# Patient Record
Sex: FEMALE | Race: WHITE | NOT HISPANIC OR LATINO
[De-identification: names, ages, dates, MRNs, and addresses within clinical notes are randomized per-mention and may not be internally consistent; named-entity substitution may affect disease eponyms.]

---

## 2017-07-01 ENCOUNTER — APPOINTMENT (OUTPATIENT)
Dept: OBGYN | Facility: CLINIC | Age: 42
End: 2017-07-01

## 2017-07-01 VITALS
DIASTOLIC BLOOD PRESSURE: 78 MMHG | HEART RATE: 73 BPM | BODY MASS INDEX: 29.82 KG/M2 | HEIGHT: 67 IN | SYSTOLIC BLOOD PRESSURE: 114 MMHG | WEIGHT: 190 LBS

## 2017-07-01 RX ORDER — TOPIRAMATE 100 MG/1
100 TABLET, FILM COATED ORAL
Qty: 60 | Refills: 0 | Status: ACTIVE | COMMUNITY
Start: 2017-03-24

## 2017-07-03 LAB — HPV HIGH+LOW RISK DNA PNL CVX: NEGATIVE

## 2017-07-06 LAB — CYTOLOGY CVX/VAG DOC THIN PREP: NORMAL

## 2017-10-28 ENCOUNTER — APPOINTMENT (OUTPATIENT)
Dept: OBGYN | Facility: CLINIC | Age: 42
End: 2017-10-28
Payer: COMMERCIAL

## 2017-10-28 VITALS
HEART RATE: 77 BPM | WEIGHT: 188 LBS | DIASTOLIC BLOOD PRESSURE: 80 MMHG | HEIGHT: 67 IN | BODY MASS INDEX: 29.51 KG/M2 | SYSTOLIC BLOOD PRESSURE: 121 MMHG

## 2017-10-28 DIAGNOSIS — N32.81 OVERACTIVE BLADDER: ICD-10-CM

## 2017-10-28 DIAGNOSIS — N39.46 MIXED INCONTINENCE: ICD-10-CM

## 2017-10-28 PROCEDURE — 99214 OFFICE O/P EST MOD 30 MIN: CPT

## 2017-10-28 RX ORDER — POTASSIUM CHLORIDE 750 MG/1
10 TABLET, FILM COATED, EXTENDED RELEASE ORAL
Qty: 30 | Refills: 0 | Status: DISCONTINUED | COMMUNITY
Start: 2017-01-03 | End: 2017-10-28

## 2017-11-03 RX ORDER — SOLIFENACIN SUCCINATE 5 MG/1
5 TABLET, FILM COATED ORAL DAILY
Qty: 30 | Refills: 11 | Status: DISCONTINUED | COMMUNITY
Start: 2017-10-28 | End: 2017-11-03

## 2018-05-08 RX ORDER — OXYBUTYNIN CHLORIDE 5 MG/1
5 TABLET, EXTENDED RELEASE ORAL
Qty: 90 | Refills: 3 | Status: ACTIVE | COMMUNITY
Start: 2018-05-08 | End: 1900-01-01

## 2018-07-02 ENCOUNTER — APPOINTMENT (OUTPATIENT)
Dept: OBGYN | Facility: CLINIC | Age: 43
End: 2018-07-02
Payer: COMMERCIAL

## 2018-07-02 VITALS
SYSTOLIC BLOOD PRESSURE: 120 MMHG | HEIGHT: 67 IN | BODY MASS INDEX: 32.02 KG/M2 | WEIGHT: 204 LBS | DIASTOLIC BLOOD PRESSURE: 70 MMHG

## 2018-07-02 PROCEDURE — 99396 PREV VISIT EST AGE 40-64: CPT

## 2018-07-03 LAB — HPV HIGH+LOW RISK DNA PNL CVX: NOT DETECTED

## 2018-07-07 LAB — CYTOLOGY CVX/VAG DOC THIN PREP: NORMAL

## 2019-07-18 ENCOUNTER — APPOINTMENT (OUTPATIENT)
Dept: OBGYN | Facility: CLINIC | Age: 44
End: 2019-07-18

## 2019-09-09 ENCOUNTER — APPOINTMENT (OUTPATIENT)
Dept: OBGYN | Facility: CLINIC | Age: 44
End: 2019-09-09
Payer: COMMERCIAL

## 2019-09-09 VITALS
WEIGHT: 220 LBS | DIASTOLIC BLOOD PRESSURE: 84 MMHG | HEIGHT: 67 IN | SYSTOLIC BLOOD PRESSURE: 121 MMHG | BODY MASS INDEX: 34.53 KG/M2

## 2019-09-09 PROCEDURE — 99396 PREV VISIT EST AGE 40-64: CPT

## 2019-09-09 NOTE — PHYSICAL EXAM
[Awake] : awake [Alert] : alert [Soft] : soft [Oriented x3] : oriented to person, place, and time [No Bleeding] : there was no active vaginal bleeding [Normal] : uterus [Uterine Adnexae] : were not tender and not enlarged [Acute Distress] : no acute distress [LAD] : no lymphadenopathy [Thyroid Nodule] : no thyroid nodule [Goiter] : no goiter [Mass] : no breast mass [Axillary LAD] : no axillary lymphadenopathy [Nipple Discharge] : no nipple discharge [Tender] : non tender [Distended] : not distended [H/Smegaly] : no hepatosplenomegaly [Flat Affect] : affect not flat [Depressed Mood] : not depressed [FreeTextEntry7] : slight ly enlarged fibroid uterus

## 2019-09-10 LAB — HPV HIGH+LOW RISK DNA PNL CVX: NOT DETECTED

## 2019-09-12 LAB — CYTOLOGY CVX/VAG DOC THIN PREP: NORMAL

## 2020-10-06 ENCOUNTER — APPOINTMENT (OUTPATIENT)
Dept: OBGYN | Facility: CLINIC | Age: 45
End: 2020-10-06
Payer: COMMERCIAL

## 2020-10-06 VITALS
WEIGHT: 222 LBS | DIASTOLIC BLOOD PRESSURE: 85 MMHG | SYSTOLIC BLOOD PRESSURE: 127 MMHG | HEIGHT: 67 IN | BODY MASS INDEX: 34.84 KG/M2

## 2020-10-06 PROCEDURE — 99396 PREV VISIT EST AGE 40-64: CPT

## 2020-10-08 LAB — HPV HIGH+LOW RISK DNA PNL CVX: NOT DETECTED

## 2020-10-12 LAB — CYTOLOGY CVX/VAG DOC THIN PREP: NORMAL

## 2021-03-03 ENCOUNTER — APPOINTMENT (OUTPATIENT)
Dept: OBGYN | Facility: CLINIC | Age: 46
End: 2021-03-03
Payer: COMMERCIAL

## 2021-03-03 PROCEDURE — 99443: CPT | Mod: 95

## 2021-05-06 ENCOUNTER — APPOINTMENT (OUTPATIENT)
Dept: OBGYN | Facility: CLINIC | Age: 46
End: 2021-05-06
Payer: COMMERCIAL

## 2021-05-06 PROCEDURE — 99443: CPT | Mod: 95

## 2021-05-06 RX ORDER — DROSPIRENONE, ETHINYL ESTRADIOL AND LEVOMEFOLATE CALCIUM AND LEVOMEFOLATE CALCIUM 3-0.02(24)
3-0.02-0.451 KIT ORAL DAILY
Qty: 84 | Refills: 1 | Status: DISCONTINUED | COMMUNITY
Start: 2021-03-03 | End: 2021-05-06

## 2021-05-06 NOTE — HISTORY OF PRESENT ILLNESS
[FreeTextEntry1] : 47 yo started on beyaz in march, pt is c/o periods lasting longer than usually, 8 days, she also c/o heavier periods also. She is not changing pads every 1 hour but would like her periods to be less. She is taking pill as directed, one per day, has no other side effects.

## 2021-05-06 NOTE — REASON FOR VISIT
[Home] : at home, [unfilled] , at the time of the visit. [Medical Office: ([facility])___] : at the medical office located in  [Verbal consent obtained from patient] : the patient, [unfilled] [Follow-Up] : a follow-up evaluation of [Abnormal Uterine Bleeding] : abnormal uterine bleeding

## 2021-05-06 NOTE — DISCUSSION/SUMMARY
[FreeTextEntry1] : Prolonged and heavier menses on beyaz. Will switch to nuvaring, pt did some reading and is interested in trying the ring, reviewed how to put it into vagina, wear it for 3 weeks, than remove and don’t wear anything for 1 week, reinsert a new ring after the week is over. RBAD\par rt in 2 months for BP/weight check/breast exam

## 2021-10-12 ENCOUNTER — APPOINTMENT (OUTPATIENT)
Dept: OBGYN | Facility: CLINIC | Age: 46
End: 2021-10-12
Payer: COMMERCIAL

## 2021-10-12 VITALS
SYSTOLIC BLOOD PRESSURE: 138 MMHG | HEIGHT: 67 IN | WEIGHT: 211 LBS | BODY MASS INDEX: 33.12 KG/M2 | HEART RATE: 81 BPM | DIASTOLIC BLOOD PRESSURE: 86 MMHG

## 2021-10-12 DIAGNOSIS — Z12.39 ENCOUNTER FOR OTHER SCREENING FOR MALIGNANT NEOPLASM OF BREAST: ICD-10-CM

## 2021-10-12 PROCEDURE — 99396 PREV VISIT EST AGE 40-64: CPT

## 2021-10-12 NOTE — PHYSICAL EXAM
[Awake] : awake [Alert] : alert [Acute Distress] : no acute distress [LAD] : no lymphadenopathy [Thyroid Nodule] : no thyroid nodule [Goiter] : no goiter [Mass] : no breast mass [Nipple Discharge] : no nipple discharge [Axillary LAD] : no axillary lymphadenopathy [Soft] : soft [Tender] : non tender [Distended] : not distended [H/Smegaly] : no hepatosplenomegaly [Oriented x3] : oriented to person, place, and time [Depressed Mood] : not depressed [Flat Affect] : affect not flat [Normal] : uterus [No Bleeding] : there was no active vaginal bleeding [Uterine Adnexae] : were not tender and not enlarged [FreeTextEntry7] : slight ly enlarged fibroid uterus

## 2021-10-14 LAB — HPV HIGH+LOW RISK DNA PNL CVX: NOT DETECTED

## 2021-10-18 LAB — CYTOLOGY CVX/VAG DOC THIN PREP: NORMAL

## 2021-12-01 ENCOUNTER — TRANSCRIPTION ENCOUNTER (OUTPATIENT)
Age: 46
End: 2021-12-01

## 2021-12-27 DIAGNOSIS — N63.0 UNSPECIFIED LUMP IN UNSPECIFIED BREAST: ICD-10-CM

## 2022-02-18 ENCOUNTER — APPOINTMENT (OUTPATIENT)
Dept: OBGYN | Facility: CLINIC | Age: 47
End: 2022-02-18
Payer: COMMERCIAL

## 2022-02-18 PROCEDURE — 99442: CPT | Mod: 95

## 2022-02-18 RX ORDER — ETONOGESTREL AND ETHINYL ESTRADIOL 11.7; 2.7 MG/1; MG/1
0.12-0.015 INSERT, EXTENDED RELEASE VAGINAL
Qty: 1 | Refills: 1 | Status: DISCONTINUED | COMMUNITY
Start: 2021-05-06 | End: 2022-02-18

## 2022-02-18 NOTE — HISTORY OF PRESENT ILLNESS
[FreeTextEntry1] : 45 yo states she used an online service to get put back on ocp, junel 1/20 fe 2 months ago. She would like to know if irregular bleeding is common in the beginning. She is taking it the same time daily. I have reviewed the common side effects and told pt we like to wait 3 months before changing pill since this side effect unusually subsides in 3-4 months. Pt will make appt. in office in 1 month to check bp/weigth and general f/u. She agrees with plan

## 2022-02-18 NOTE — REASON FOR VISIT
[Home] : at home, [unfilled] , at the time of the visit. [Medical Office: ([facility])___] : at the medical office located in  [Verbal consent obtained from patient] : the patient, [unfilled] [Follow-Up] : a follow-up evaluation of

## 2022-03-08 ENCOUNTER — APPOINTMENT (OUTPATIENT)
Dept: OBGYN | Facility: CLINIC | Age: 47
End: 2022-03-08
Payer: COMMERCIAL

## 2022-03-08 ENCOUNTER — RESULT CHARGE (OUTPATIENT)
Age: 47
End: 2022-03-08

## 2022-03-08 VITALS
BODY MASS INDEX: 34.53 KG/M2 | DIASTOLIC BLOOD PRESSURE: 88 MMHG | HEIGHT: 67 IN | WEIGHT: 220 LBS | SYSTOLIC BLOOD PRESSURE: 132 MMHG

## 2022-03-08 LAB
HCG UR QL: NEGATIVE
QUALITY CONTROL: YES

## 2022-03-08 PROCEDURE — 58100 BIOPSY OF UTERUS LINING: CPT

## 2022-03-08 PROCEDURE — 81025 URINE PREGNANCY TEST: CPT

## 2022-03-08 PROCEDURE — 99214 OFFICE O/P EST MOD 30 MIN: CPT | Mod: 25

## 2022-03-08 NOTE — PLAN
[FreeTextEntry1] : heavy menses/prolonged menses- I performed an Emb today, pt tolerated well. We discussed causes of heavy , prolonged menses. I would like her to return for pelvic sono. She is considering Nexplanon. In the mean time I will switch pill to Lore(pt read online reviews this was a good pill). lore is 1/20 and I feel she would do better with 1.5/30 so Inna has been called in. Reviewed RBAD of ocp, dvt and emboli risks were discussed. She does not want an Iud and I have also discussed uterine ablation as another option to control uterine bleeding.\par \par I will call her with results of EMB\par Motrin 600mg for cramping, q 6 hours

## 2022-03-08 NOTE — HISTORY OF PRESENT ILLNESS
[FreeTextEntry1] : 45 yo has been on junel 1/20 for 3 months, was put on it by an online service. She went on it because in November she had 10 days of bleeding. She is c/o heavy periods for the past  7 months. She is worried about the large blood clots she passes, changes every 3 hours. She is a previous smoker, no family h/o uterine cancer.

## 2022-03-08 NOTE — PROCEDURE
[Endometrial Biopsy] : Endometrial biopsy [Consent Obtained] : Consent obtained [Irregular Bleeding] : irregular uterine bleeding [Risks] : risks [Benefits] : benefits [Alternatives] : alternatives [Infection] : infection [Bleeding] : bleeding [Allergic Reaction] : allergic reaction [Uterine Perforation] : uterine perforation [Pain] : pain [Negative] : negative pregnancy test [No Premedication] : No premedication [None] : none [Tenaculum] : Tenaculum [Easy Passage] : Easy passage [Sounded to ___ cm] : sounded to [unfilled] ~Ucm [Mid Position] : mid position [Moderate] : moderate [Tolerated Well] : Patient tolerated the procedure well [No Complications] : No complications

## 2022-03-09 ENCOUNTER — TRANSCRIPTION ENCOUNTER (OUTPATIENT)
Age: 47
End: 2022-03-09

## 2022-03-16 LAB — CORE LAB BIOPSY: NORMAL

## 2022-03-17 ENCOUNTER — APPOINTMENT (OUTPATIENT)
Dept: ANTEPARTUM | Facility: CLINIC | Age: 47
End: 2022-03-17
Payer: COMMERCIAL

## 2022-03-17 ENCOUNTER — APPOINTMENT (OUTPATIENT)
Dept: OBGYN | Facility: CLINIC | Age: 47
End: 2022-03-17
Payer: COMMERCIAL

## 2022-03-17 ENCOUNTER — ASOB RESULT (OUTPATIENT)
Age: 47
End: 2022-03-17

## 2022-03-17 VITALS
BODY MASS INDEX: 34.53 KG/M2 | SYSTOLIC BLOOD PRESSURE: 143 MMHG | HEIGHT: 67 IN | WEIGHT: 220 LBS | HEART RATE: 77 BPM | DIASTOLIC BLOOD PRESSURE: 96 MMHG

## 2022-03-17 DIAGNOSIS — N83.291 OTHER OVARIAN CYST, RIGHT SIDE: ICD-10-CM

## 2022-03-17 PROCEDURE — 76830 TRANSVAGINAL US NON-OB: CPT

## 2022-03-17 PROCEDURE — 76856 US EXAM PELVIC COMPLETE: CPT | Mod: 59

## 2022-03-17 PROCEDURE — 99214 OFFICE O/P EST MOD 30 MIN: CPT

## 2022-03-17 NOTE — REASON FOR VISIT
[Follow-Up] : a follow-up evaluation of [FreeTextEntry2] : Ultrasound and discussion of plan of care

## 2022-03-17 NOTE — PLAN
[FreeTextEntry1] : \par Transvaginal/pelvic ultrasound reviewed with patient in detail and Risks, benefits, and alternatives of management options discussed at length.  Patient's ELIGIO was recently adjusted, and her bleeding stopped, and she wishes to continue with this medication for the meantime.  Patient was counseled on risk for VTE on a ELIGIO with concurrent hypertension.  Patient was counseled on long-acting reproductive contraceptives, specifically the Mirena or Kyleena IUD.  She did not wish for an IUD to be placed, but will think about it if the abnormal bleeding returns.\par \par Ovarian cyst discussed including pathology.  It appears to be a simple cyst and patient was given the signs and symptoms of cyst rupture.  If she develops symptoms she may return to office for repeat ultrasound to assess status.\par \par She is given opportunity ask questions all questions were addressed.

## 2022-03-17 NOTE — HISTORY OF PRESENT ILLNESS
[FreeTextEntry1] : 46-year-old female presenting office for her transvaginal/pelvic ultrasound secondary to a recent occurrence of abnormal uterine bleeding.  Patient's ELIGIO was recently adjusted, and she reports that her bleeding has since ceased.  She has not had this happen in the past.  She had an endometrial biopsy performed on 3/8/2022 which resulted negative for hyperplasia and negative for malignancy.  Her Pap smear  performed 10/12/2021 was within normal limits.\par \par Obstetrical history 1 prior vaginal delivery of a 9 pound baby.  Denies prior gynecologic history of uterine fibroids, ovarian cyst, STIs, or abnormal Pap smears.  Medical history of hypertension and she is currently on medication.  Her blood pressure is slightly elevated upon initial assessment today.  She reports she did not take her medication this morning.

## 2022-05-16 ENCOUNTER — RX RENEWAL (OUTPATIENT)
Age: 47
End: 2022-05-16

## 2022-06-27 ENCOUNTER — APPOINTMENT (OUTPATIENT)
Dept: OBGYN | Facility: CLINIC | Age: 47
End: 2022-06-27
Payer: SELF-PAY

## 2022-06-27 PROCEDURE — 99443: CPT | Mod: 95

## 2022-06-27 RX ORDER — MISOPROSTOL 200 UG/1
200 TABLET ORAL
Qty: 1 | Refills: 0 | Status: ACTIVE | COMMUNITY
Start: 2022-06-27 | End: 1900-01-01

## 2022-06-27 NOTE — HISTORY OF PRESENT ILLNESS
[FreeTextEntry1] : 48 yo telephonic to discuss getting an iud. She has a h/o long lasting periods, she has been on Junel but she has trouble remembering to take it. She has had vaginal deliveries in the past. I rec. she go on Mirena iud. All risks and benefits have been discussed in detail, pain with insertion discussed, irregular bleeding for 3-6 months common, risks of perforation, expulsion, difficulties in removal, infection.\par Pt is very interested, has asked appropriate question which I have answered. She will use Cytotec the night before procedure, motrin 600mg 1-2 hrs before. She will rt for insertion with her next period.

## 2022-06-27 NOTE — REASON FOR VISIT
[Home] : at home, [unfilled] , at the time of the visit. [Medical Office: ([facility])___] : at the medical office located in  [Verbal consent obtained from patient] : the patient, [unfilled] [Follow-Up] : a follow-up evaluation of

## 2022-06-30 ENCOUNTER — APPOINTMENT (OUTPATIENT)
Dept: OBGYN | Facility: CLINIC | Age: 47
End: 2022-06-30

## 2022-08-01 ENCOUNTER — APPOINTMENT (OUTPATIENT)
Dept: OBGYN | Facility: CLINIC | Age: 47
End: 2022-08-01

## 2022-08-20 ENCOUNTER — RESULT CHARGE (OUTPATIENT)
Age: 47
End: 2022-08-20

## 2022-08-20 ENCOUNTER — APPOINTMENT (OUTPATIENT)
Dept: OBGYN | Facility: CLINIC | Age: 47
End: 2022-08-20

## 2022-08-20 VITALS
WEIGHT: 221 LBS | HEIGHT: 67 IN | DIASTOLIC BLOOD PRESSURE: 80 MMHG | BODY MASS INDEX: 34.69 KG/M2 | SYSTOLIC BLOOD PRESSURE: 132 MMHG

## 2022-08-20 LAB
HCG UR QL: NEGATIVE
QUALITY CONTROL: YES

## 2022-08-20 PROCEDURE — 81025 URINE PREGNANCY TEST: CPT

## 2022-08-20 PROCEDURE — 58300 INSERT INTRAUTERINE DEVICE: CPT

## 2022-08-20 RX ORDER — LEVONORGESTREL 52 MG/1
20 INTRAUTERINE DEVICE INTRAUTERINE
Qty: 0 | Refills: 0 | Status: COMPLETED | OUTPATIENT
Start: 2022-08-20

## 2022-08-20 RX ADMIN — LEVONORGESTREL MCG/DAY: 52 INTRAUTERINE DEVICE INTRAUTERINE at 00:00

## 2022-09-28 ENCOUNTER — APPOINTMENT (OUTPATIENT)
Dept: ANTEPARTUM | Facility: CLINIC | Age: 47
End: 2022-09-28
Payer: COMMERCIAL

## 2022-09-28 ENCOUNTER — APPOINTMENT (OUTPATIENT)
Dept: OBGYN | Facility: CLINIC | Age: 47
End: 2022-09-28

## 2022-09-28 ENCOUNTER — ASOB RESULT (OUTPATIENT)
Age: 47
End: 2022-09-28

## 2022-09-28 PROCEDURE — 76856 US EXAM PELVIC COMPLETE: CPT | Mod: 59

## 2022-09-28 PROCEDURE — 76830 TRANSVAGINAL US NON-OB: CPT

## 2022-10-03 ENCOUNTER — APPOINTMENT (OUTPATIENT)
Dept: OBGYN | Facility: CLINIC | Age: 47
End: 2022-10-03

## 2022-10-03 NOTE — PLAN
[FreeTextEntry1] : Iud insert, Mirena\par nothing in vagina for 1 week, advil 600mg q6 hours for cramps, rt in 6 weeks for pelvic sono and ov

## 2022-10-03 NOTE — PROCEDURE
[IUD Placement] : intrauterine device (IUD) placement [Risks] : risks [Benefits] : benefits [Alternatives] : alternatives [Infection] : infection [Bleeding] : bleeding [Pain] : pain [Expulsion] : expulsion [Failure] : failure [Uterine Perforation] : uterine perforation [Neg Pregnancy Test] : negative pregnancy test [Cytotec] : Cytotec [Sounded to ___ cm] : sounded to [unfilled] ~Ucm [Mirena IUD] : Mirena IUD [Tolerated Well] : Patient tolerated the procedure well [No Complications] : No complications [Motrin/Ibuprofen] : Motrin/Ibuprofen [de-identified] : heavy periods

## 2022-10-03 NOTE — HISTORY OF PRESENT ILLNESS
[FreeTextEntry1] : 48 yo here for Mirena iud insert. She has been previously counselled about this product, side effects, adverse effects ect... consent signed\par \par H/o heavy menses, emb march 2022 negative

## 2022-11-22 ENCOUNTER — NON-APPOINTMENT (OUTPATIENT)
Age: 47
End: 2022-11-22

## 2022-12-27 ENCOUNTER — NON-APPOINTMENT (OUTPATIENT)
Age: 47
End: 2022-12-27

## 2022-12-27 ENCOUNTER — APPOINTMENT (OUTPATIENT)
Dept: OBGYN | Facility: CLINIC | Age: 47
End: 2022-12-27

## 2022-12-27 VITALS
SYSTOLIC BLOOD PRESSURE: 142 MMHG | DIASTOLIC BLOOD PRESSURE: 80 MMHG | BODY MASS INDEX: 35.16 KG/M2 | HEIGHT: 67 IN | WEIGHT: 224 LBS

## 2022-12-27 DIAGNOSIS — Z01.419 ENCOUNTER FOR GYNECOLOGICAL EXAMINATION (GENERAL) (ROUTINE) W/OUT ABNORMAL FINDINGS: ICD-10-CM

## 2022-12-27 PROCEDURE — 99396 PREV VISIT EST AGE 40-64: CPT

## 2022-12-28 LAB — HPV HIGH+LOW RISK DNA PNL CVX: NOT DETECTED

## 2023-01-07 LAB — CYTOLOGY CVX/VAG DOC THIN PREP: NORMAL

## 2023-07-18 ENCOUNTER — APPOINTMENT (OUTPATIENT)
Dept: OBGYN | Facility: CLINIC | Age: 48
End: 2023-07-18
Payer: COMMERCIAL

## 2023-07-18 VITALS
SYSTOLIC BLOOD PRESSURE: 120 MMHG | DIASTOLIC BLOOD PRESSURE: 80 MMHG | WEIGHT: 235 LBS | BODY MASS INDEX: 36.88 KG/M2 | HEIGHT: 67 IN

## 2023-07-18 PROCEDURE — 58301 REMOVE INTRAUTERINE DEVICE: CPT

## 2023-07-18 NOTE — PROCEDURE
[IUD Removal] : intrauterine device (IUD) removal [Risks] : risks [Benefits] : benefits [Alternatives] : alternatives [Patient] : patient [Speculum Placed] : speculum placed [Strings Visualized] : strings visualized [IUD Discarded] : IUD discarded [Tolerated Well] : Patient tolerated the procedure well [de-identified] : gained weight

## 2023-11-26 ENCOUNTER — NON-APPOINTMENT (OUTPATIENT)
Age: 48
End: 2023-11-26

## 2023-12-29 ENCOUNTER — APPOINTMENT (OUTPATIENT)
Dept: OBGYN | Facility: CLINIC | Age: 48
End: 2023-12-29
Payer: COMMERCIAL

## 2023-12-29 VITALS — WEIGHT: 236 LBS | HEIGHT: 67 IN | BODY MASS INDEX: 37.04 KG/M2

## 2023-12-29 DIAGNOSIS — Z11.3 ENCOUNTER FOR SCREENING FOR INFECTIONS WITH A PREDOMINANTLY SEXUAL MODE OF TRANSMISSION: ICD-10-CM

## 2023-12-29 DIAGNOSIS — Z01.419 ENCOUNTER FOR GYNECOLOGICAL EXAMINATION (GENERAL) (ROUTINE) W/OUT ABNORMAL FINDINGS: ICD-10-CM

## 2023-12-29 DIAGNOSIS — Z30.41 ENCOUNTER FOR SURVEILLANCE OF CONTRACEPTIVE PILLS: ICD-10-CM

## 2023-12-29 DIAGNOSIS — Z30.430 ENCOUNTER FOR INSERTION OF INTRAUTERINE CONTRACEPTIVE DEVICE: ICD-10-CM

## 2023-12-29 DIAGNOSIS — Z87.42 PERSONAL HISTORY OF OTHER DISEASES OF THE FEMALE GENITAL TRACT: ICD-10-CM

## 2023-12-29 DIAGNOSIS — Z30.09 ENCOUNTER FOR OTHER GENERAL COUNSELING AND ADVICE ON CONTRACEPTION: ICD-10-CM

## 2023-12-29 PROCEDURE — 99396 PREV VISIT EST AGE 40-64: CPT | Mod: 25

## 2023-12-29 PROCEDURE — 99214 OFFICE O/P EST MOD 30 MIN: CPT | Mod: 25

## 2023-12-29 RX ORDER — LEVONORGESTREL AND ETHINYL ESTRADIOL 0.15-0.03
0.15-3 KIT ORAL
Qty: 3 | Refills: 0 | Status: COMPLETED | COMMUNITY
Start: 2022-03-08 | End: 2023-12-29

## 2023-12-29 RX ORDER — LEVONORGESTREL AND ETHINYL ESTRADIOL 0.15-0.03
0.15-3 KIT ORAL
Qty: 84 | Refills: 3 | Status: COMPLETED | COMMUNITY
Start: 2022-05-16 | End: 2023-12-29

## 2023-12-29 NOTE — HISTORY OF PRESENT ILLNESS
[FreeTextEntry1] : 48-year-old female  presenting office for her annual well appointment and additional concern of STI exposure secondary to infidelity.  She is understandably upset.  She has no signs or symptoms of infection at this time.  She is not currently on a contraceptive and does not wish to be.  She has no further signs or symptoms of abnormal uterine bleeding.  Obstetrical history 1 prior vaginal delivery.  Denies prior gynecologic history of uterine fibroids, ovarian cyst, STIs, or abnormal Pap smears.  Medical history of hypertension and she is currently on medication.  She does have a history of a breast nodule which was followed her last mammogram/ultrasound resulted BI-RADS Category 1.  Denies personal or family history of gynecologic, breast, colon cancer.  Denies alcohol, tobacco, illicit drug use.  Denies allergies to medications.

## 2023-12-29 NOTE — COUNSELING
[Nutrition/ Exercise/ Weight Management] : nutrition, exercise, weight management [Vitamins/Supplements] : vitamins/supplements [Breast Self Exam] : breast self exam [Contraception/ Emergency Contraception/ Safe Sexual Practices] : contraception, emergency contraception, safe sexual practices [Confidentiality] : confidentiality [STD (testing, results, tx)] : STD (testing, results, tx) [Lab Results] : lab results

## 2023-12-29 NOTE — PLAN
[FreeTextEntry1] : Clinical breast exam performed and self breast exam explained along with indications for screening.  Patient has been followed in the past for a breast nodule and her last mammogram/ultrasound resulted BI-RADS Category 1.  Rx for mammogram and ultrasound was placed in the system.  Cervical Pap smear was collected along with STI screening secondary to concern for STI exposure.  Patient will also have serum blood testing for STI exposure was counseled if concern is high she should repeat this test in 6 months time, secondary to timeouts of seroconversion.  Patient does not wish to have contraceptive at this time, but does understand it may be made available upon her request.  She has no further signs of abnormal uterine bleeding and has been counseled if it returns she should schedule for repeat ultrasound/blood work/assessment.  She is given opportunity ask questions and all questions were addressed.  She understands plan of care.

## 2023-12-29 NOTE — PHYSICAL EXAM
[Alert] : alert [Appropriately responsive] : appropriately responsive [No Acute Distress] : no acute distress [Soft] : soft [Non-tender] : non-tender [Non-distended] : non-distended [No HSM] : No HSM [No Lesions] : no lesions [No Mass] : no mass [Oriented x3] : oriented x3 [Examination Of The Breasts] : a normal appearance [No Masses] : no breast masses were palpable [Labia Majora] : normal [Labia Minora] : normal [Normal] : normal [FreeTextEntry4] : Normal physiologic discharge with no signs of active infection [FreeTextEntry5] : Cervical Pap smear and STI culture collected

## 2023-12-29 NOTE — REASON FOR VISIT
[Annual] : an annual visit. [FreeTextEntry2] : Annual well woman appointment with additional concern

## 2024-01-02 LAB
C TRACH RRNA SPEC QL NAA+PROBE: NOT DETECTED
HPV HIGH+LOW RISK DNA PNL CVX: NOT DETECTED
N GONORRHOEA RRNA SPEC QL NAA+PROBE: NOT DETECTED
SOURCE TP AMPLIFICATION: NORMAL

## 2024-01-03 LAB — CYTOLOGY CVX/VAG DOC THIN PREP: ABNORMAL

## 2024-01-27 ENCOUNTER — APPOINTMENT (OUTPATIENT)
Dept: ORTHOPEDIC SURGERY | Facility: CLINIC | Age: 49
End: 2024-01-27
Payer: COMMERCIAL

## 2024-01-27 VITALS — BODY MASS INDEX: 36.88 KG/M2 | WEIGHT: 235 LBS | HEIGHT: 67 IN

## 2024-01-27 DIAGNOSIS — I10 ESSENTIAL (PRIMARY) HYPERTENSION: ICD-10-CM

## 2024-01-27 DIAGNOSIS — M47.816 SPONDYLOSIS W/OUT MYELOPATHY OR RADICULOPATHY, LUMBAR REGION: ICD-10-CM

## 2024-01-27 DIAGNOSIS — S33.5XXA SPRAIN OF LIGAMENTS OF LUMBAR SPINE, INITIAL ENCOUNTER: ICD-10-CM

## 2024-01-27 PROCEDURE — 99204 OFFICE O/P NEW MOD 45 MIN: CPT

## 2024-01-27 PROCEDURE — 72100 X-RAY EXAM L-S SPINE 2/3 VWS: CPT

## 2024-01-27 RX ORDER — PREDNISONE 10 MG/1
10 TABLET ORAL
Qty: 15 | Refills: 0 | Status: DISCONTINUED | COMMUNITY
Start: 2017-01-30 | End: 2024-01-27

## 2024-01-27 RX ORDER — DARIFENACIN HYDROBROMIDE 7.5 MG/1
7.5 TABLET, EXTENDED RELEASE ORAL
Qty: 90 | Refills: 1 | Status: DISCONTINUED | COMMUNITY
Start: 2017-11-03 | End: 2024-01-27

## 2024-01-27 RX ORDER — SUMATRIPTAN 100 MG/1
100 TABLET, FILM COATED ORAL
Qty: 9 | Refills: 0 | Status: DISCONTINUED | COMMUNITY
Start: 2017-02-01 | End: 2024-01-27

## 2024-01-27 RX ORDER — CHLORTHALIDONE 25 MG/1
25 TABLET ORAL
Qty: 30 | Refills: 0 | Status: DISCONTINUED | COMMUNITY
Start: 2017-04-12 | End: 2024-01-27

## 2024-01-27 RX ORDER — METHYLPREDNISOLONE 4 MG/1
4 TABLET ORAL
Qty: 1 | Refills: 0 | Status: ACTIVE | COMMUNITY
Start: 2024-01-27 | End: 1900-01-01

## 2024-07-02 ENCOUNTER — NON-APPOINTMENT (OUTPATIENT)
Age: 49
End: 2024-07-02

## 2025-01-02 ENCOUNTER — APPOINTMENT (OUTPATIENT)
Dept: OBGYN | Facility: CLINIC | Age: 50
End: 2025-01-02
Payer: COMMERCIAL

## 2025-01-02 VITALS
WEIGHT: 221 LBS | HEIGHT: 67 IN | BODY MASS INDEX: 34.69 KG/M2 | SYSTOLIC BLOOD PRESSURE: 145 MMHG | DIASTOLIC BLOOD PRESSURE: 92 MMHG

## 2025-01-02 DIAGNOSIS — Z71.1 PERSON WITH FEARED HEALTH COMPLAINT IN WHOM NO DIAGNOSIS IS MADE: ICD-10-CM

## 2025-01-02 DIAGNOSIS — Z01.419 ENCOUNTER FOR GYNECOLOGICAL EXAMINATION (GENERAL) (ROUTINE) W/OUT ABNORMAL FINDINGS: ICD-10-CM

## 2025-01-02 PROCEDURE — 99396 PREV VISIT EST AGE 40-64: CPT

## 2025-01-06 LAB
C TRACH RRNA SPEC QL NAA+PROBE: NOT DETECTED
CYTOLOGY CVX/VAG DOC THIN PREP: ABNORMAL
HPV HIGH+LOW RISK DNA PNL CVX: DETECTED
N GONORRHOEA RRNA SPEC QL NAA+PROBE: NOT DETECTED
SOURCE TP AMPLIFICATION: NORMAL

## 2025-02-17 ENCOUNTER — APPOINTMENT (OUTPATIENT)
Dept: OBGYN | Facility: CLINIC | Age: 50
End: 2025-02-17

## 2025-04-18 ENCOUNTER — APPOINTMENT (OUTPATIENT)
Dept: OBGYN | Facility: CLINIC | Age: 50
End: 2025-04-18

## 2025-06-18 ENCOUNTER — NON-APPOINTMENT (OUTPATIENT)
Age: 50
End: 2025-06-18

## 2025-09-19 ENCOUNTER — TRANSCRIPTION ENCOUNTER (OUTPATIENT)
Age: 50
End: 2025-09-19

## 2025-09-19 DIAGNOSIS — N92.6 IRREGULAR MENSTRUATION, UNSPECIFIED: ICD-10-CM

## 2025-09-19 RX ORDER — NORETHINDRONE ACETATE 5 MG/1
5 TABLET ORAL
Qty: 42 | Refills: 0 | Status: ACTIVE | COMMUNITY
Start: 2025-09-19 | End: 1900-01-01